# Patient Record
Sex: MALE | Race: OTHER | NOT HISPANIC OR LATINO | Employment: FULL TIME | URBAN - METROPOLITAN AREA
[De-identification: names, ages, dates, MRNs, and addresses within clinical notes are randomized per-mention and may not be internally consistent; named-entity substitution may affect disease eponyms.]

---

## 2022-05-12 ENCOUNTER — OFFICE VISIT (OUTPATIENT)
Dept: URGENT CARE | Age: 27
End: 2022-05-12
Payer: COMMERCIAL

## 2022-05-12 ENCOUNTER — APPOINTMENT (OUTPATIENT)
Dept: RADIOLOGY | Age: 27
End: 2022-05-12
Payer: COMMERCIAL

## 2022-05-12 VITALS
HEIGHT: 75 IN | WEIGHT: 250 LBS | DIASTOLIC BLOOD PRESSURE: 83 MMHG | HEART RATE: 49 BPM | BODY MASS INDEX: 31.08 KG/M2 | SYSTOLIC BLOOD PRESSURE: 129 MMHG | TEMPERATURE: 97.7 F | RESPIRATION RATE: 18 BRPM | OXYGEN SATURATION: 99 %

## 2022-05-12 DIAGNOSIS — R07.1 CHEST PAIN ON BREATHING: Primary | ICD-10-CM

## 2022-05-12 DIAGNOSIS — R07.1 CHEST PAIN ON BREATHING: ICD-10-CM

## 2022-05-12 LAB
ATRIAL RATE: 47 BPM
P AXIS: 27 DEGREES
PR INTERVAL: 166 MS
QRS AXIS: 51 DEGREES
QRSD INTERVAL: 88 MS
QT INTERVAL: 428 MS
QTC INTERVAL: 378 MS
T WAVE AXIS: 24 DEGREES
VENTRICULAR RATE: 47 BPM

## 2022-05-12 PROCEDURE — 93010 ELECTROCARDIOGRAM REPORT: CPT | Performed by: INTERNAL MEDICINE

## 2022-05-12 PROCEDURE — 71046 X-RAY EXAM CHEST 2 VIEWS: CPT

## 2022-05-12 PROCEDURE — G0383 LEV 4 HOSP TYPE B ED VISIT: HCPCS | Performed by: FAMILY MEDICINE

## 2022-05-12 PROCEDURE — 93005 ELECTROCARDIOGRAM TRACING: CPT | Performed by: FAMILY MEDICINE

## 2022-05-12 NOTE — PATIENT INSTRUCTIONS
Chest Pain   AMBULATORY CARE:   Chest pain  can be caused by a range of conditions, from not serious to life-threatening  It is important to follow up with your healthcare provider to find the cause of your chest pain  Common symptoms you may have with chest pain:   Fever or sweating    Nausea or vomiting    Shortness of breath    Discomfort or pressure that spreads from your chest to your back, jaw, or arm    A racing or slow heartbeat    Feeling weak, tired, or faint    Call your local emergency number (911 in the [facility]) or have someone call if:   You have any of the following signs of a heart attack:      Squeezing, pressure, or pain in your chest    You may  also have any of the following:     Discomfort or pain in your back, neck, jaw, stomach, or arm    Shortness of breath    Nausea or vomiting    Lightheadedness or a sudden cold sweat      Seek care immediately if:   You have chest discomfort that gets worse, even with medicine  You cough or vomit blood  Your bowel movements are black or bloody  You cannot stop vomiting, or it hurts to swallow  Call your doctor if:   You have questions or concerns about your condition or care  Treatment for chest pain  may include medicine to treat your symptoms while your healthcare provider finds the cause of your chest pain  Medicines  may be given to treat the cause of your chest pain  Examples include pain medicine, anxiety medicine, or medicines to increase blood flow to your heart  Do not take certain medicines without asking your healthcare provider first   These include NSAIDs, herbal or vitamin supplements, and hormones, such as estrogen or progestin  One or more stents  may need to be placed in your heart if pain was caused by blockage  A stent is a wire mesh tube that helps hold your artery open  Healthy living tips: If the cause of your chest pain is known, your healthcare provider will give you specific guidelines to follow   The following are general healthy guidelines:  Do not smoke  Nicotine and other chemicals in cigarettes and cigars can cause lung and heart damage  Ask your healthcare provider for information if you currently smoke and need help to quit  E-cigarettes or smokeless tobacco still contain nicotine  Talk to your healthcare provider before you use these products  Choose a variety of healthy foods as often as possible  Include fresh, frozen, or canned fruits and vegetables  Also include low-fat dairy products, fish, chicken (without skin), and lean meats  Your healthcare provider or a dietitian can help you create meal plans  You may need to avoid certain foods or drinks if your pain is caused by a digestion problem  Lower your sodium (salt) intake  Limit foods that are high in sodium, such as canned foods, salty snacks, and cold cuts  If you add salt when you cook food, do not add more at the table  Choose low-sodium canned foods as much as possible  Drink plenty of water every day  Water helps your body to control your temperature and blood pressure  Ask your healthcare provider how much water you should drink every day  Ask about activity  Your healthcare provider will tell you which activities to limit or avoid  Ask when you can drive, return to work, and have sex  Ask about the best exercise plan for you  Maintain a healthy weight  Ask your healthcare provider what a healthy weight is for you  Ask him or her to help you create a safe weight loss plan if you are overweight  Ask about vaccines you may need  Your healthcare provider can tell you which vaccines you need, and when to get them  The following vaccines help prevent diseases that can become serious for a person with a heart condition:    The influenza (flu) vaccine is given each year  Get a flu vaccine as soon as recommended, usually in September or October  The pneumonia vaccine is usually given every 5 years    Your healthcare provider may recommend the pneumonia vaccine if you are 72 or older  COVID-19 vaccines are given to adults as a shot in 1 or 2 doses  Vaccination is recommended for all adults  A booster (additional) dose is also recommended to help your immune system continue to protect against severe COVID-19  The booster can be a different brand of the COVID-19 vaccine than you originally received  The timing for the booster depends on the type of vaccine you received:    1-dose vaccine: The booster is given at least 2 months after you received the vaccine  2-dose vaccine: The booster is given at least 5 to 6 months after the second dose  Follow up with your doctor within 72 hours, or as directed: You may need to return for more tests to find the cause of your chest pain  You may be referred to a specialist, such as a cardiologist or gastroenterologist  Write down your questions so you remember to ask them during your visits  © Copyright [employer] 2022 Information is for End User's use only and may not be sold, redistributed or otherwise used for commercial purposes  All illustrations and images included in CareNotes® are the copyrighted property of [employer] [facility] [employer]  or [facility] Florina Alexandre   The above information is an  only  It is not intended as medical advice for individual conditions or treatments  Talk to your doctor, nurse or pharmacist before following any medical regimen to see if it is safe and effective for you

## 2022-05-12 NOTE — LETTER
To whom it may concern,      Kristel Weir was seen in my office on 05/12/22  He may return to work tomorrow  Thank you!       Sincerely,    Varun Gayle, DO

## 2022-05-12 NOTE — PROGRESS NOTES
[email or web address]        NAME: Charlie Humphreys is a 31 y o  male  : 1995    MRN: [id number]  DATE: May 16, 2022  TIME: 4:35 PM    Assessment and Plan   Chest pain on breathing [R07 1]  1  Chest pain on breathing  XR chest pa & lateral         Patient Instructions     EKG reveals sinus bradycardia  This morning the pain was worse with deep inhalation  The pain is not reproducible  Chest x-ray shows no acute abnormalities  I did recommend that he follow-up with Cardiology due to his history of myocarditis  However, at this time of delivery he is stable and able to go home  I did explain signs and symptoms and when she should follow up in the ED  He vocalized understanding  Follow up with PCP in 3-5 days if no improvement  Proceed to  ER if symptoms worsen  Chief Complaint     Chief Complaint   Patient presents with    Chest Pain     Pt reports central CP that started this morning   +pain pulses  Pt last felt this way when he had myocarditis         History of Present Illness       31year old male presents today complaining of chest pain  He states the pain was present this morning when he woke up  The pain has since resolved  He denies any shortness of breath or increased pain with exertion  He does note that his pain increased with deep inhalation this morning  He does have a history of myocarditis and was concerned that this may be related  He has not seen his PCP or cardiologist in many years  He states that he feels fine at this time  He has no current complaints  Review of Systems   Review of Systems   Constitutional: Negative for chills and fever  HENT: Negative for congestion, ear pain, postnasal drip, sinus pain and sore throat  Eyes: Negative for pain and itching  Respiratory: Negative for cough, shortness of breath and wheezing  Cardiovascular: Negative for chest pain and palpitations     Gastrointestinal: Negative for abdominal pain, constipation, diarrhea, nausea and vomiting  Genitourinary: Negative for difficulty urinating and hematuria  Musculoskeletal: Negative for arthralgias and myalgias  Skin: Negative for rash  Neurological: Negative for dizziness, light-headedness and headaches  Psychiatric/Behavioral: Negative for agitation and sleep disturbance  The patient is not nervous/anxious  Current Medications     No current outpatient medications on file  Current Allergies     Allergies as of 05/12/2022 - Reviewed 05/12/2022   Allergen Reaction Noted    Penicillins Hives 05/12/2022            The following portions of the patient's history were reviewed and updated as appropriate: allergies, current medications, past family history, past medical history, past social history, past surgical history and problem list      Past Medical History:   Diagnosis Date    Other acute myocarditis        Past Surgical History:   Procedure Laterality Date    ELBOW SURGERY         History reviewed  No pertinent family history  Medications have been verified  Objective   /83   Pulse (!) 49   Temp 97 7 °F (36 5 °C)   Resp 18   Ht 6' 3" (1 905 m)   Wt 113 kg (250 lb)   SpO2 99%   BMI 31 25 kg/m²   No LMP for male patient  Physical Exam     Physical Exam  Vitals reviewed  Constitutional:       General: He is not in acute distress  Appearance: Normal appearance  He is not ill-appearing  HENT:      Head: Normocephalic and atraumatic  Eyes:      Extraocular Movements: Extraocular movements intact  Conjunctiva/sclera: Conjunctivae normal    Cardiovascular:      Heart sounds: Normal heart sounds  No murmur heard  Pulmonary:      Effort: Pulmonary effort is normal       Breath sounds: Normal breath sounds  No wheezing  Chest:      Chest wall: No mass or tenderness  Skin:     General: Skin is warm  Neurological:      General: No focal deficit present  Mental Status: He is alert     Psychiatric:         Mood and Affect: Mood normal          Behavior: Behavior normal          Judgment: Judgment normal          Chest x-ray is negative for any acute cardiopulmonary process

## 2022-11-17 ENCOUNTER — OFFICE VISIT (OUTPATIENT)
Dept: FAMILY MEDICINE CLINIC | Facility: CLINIC | Age: 27
End: 2022-11-17

## 2022-11-17 VITALS
HEART RATE: 64 BPM | TEMPERATURE: 97.8 F | HEIGHT: 75 IN | DIASTOLIC BLOOD PRESSURE: 78 MMHG | SYSTOLIC BLOOD PRESSURE: 130 MMHG | WEIGHT: 272.8 LBS | RESPIRATION RATE: 16 BRPM | OXYGEN SATURATION: 98 % | BODY MASS INDEX: 33.92 KG/M2

## 2022-11-17 DIAGNOSIS — Z76.89 ENCOUNTER TO ESTABLISH CARE: Primary | ICD-10-CM

## 2022-11-17 DIAGNOSIS — Z86.79 HISTORY OF VIRAL MYOCARDITIS: ICD-10-CM

## 2022-11-17 DIAGNOSIS — Z86.59 HISTORY OF ATTENTION DEFICIT HYPERACTIVITY DISORDER (ADHD): ICD-10-CM

## 2022-11-17 DIAGNOSIS — Z72.0 TOBACCO USE: ICD-10-CM

## 2022-11-17 NOTE — PROGRESS NOTES
Name: Jaqueline Kenny      : 1995      MRN: [id number]  Encounter Provider: Amador Capellan DO  Encounter Date: 2022   Encounter department: [address] [place] [address]   Patient declines flu vaccine today  Patient is being referred to [facility] for further evaluation and treatment of ADHD  Discussed lifestyle changes  Patient instructed to follow a low-fat and low-carbohydrate diet and get regular aerobic exercise 150 minutes per week  Recommend patient discontinue smoking  Return the office p r n  1  Encounter to establish care    2  History of attention deficit hyperactivity disorder (ADHD)  -     Ambulatory Referral to Psychiatry; Future    3  History of viral myocarditis    4  Tobacco use           Subjective      Patient is a 23-year-old male is a new patient to our practice being seen to establish care  Patient moved to [place] from [place] at the being of this year and currently working as a   He has not had a recent PCP  Patient graduated from [employer] with degree in sports management  Past medical history significant for ADHD diagnosed at 24years old and patient states he took Vyvanse through college  Patient has history of viral myocarditis in 2013 was admitted to [facility] 5 days and had a cardiac catheterization  Patient also had mono while in college  Patient recently recovered from wrist fracture  Patient admits to smoking half pack cigarettes per day for the past 10 years and drinks alcohol only occasionally  He admits to using marijuana occasionally  No regular exercise recently although patient remains fairly active at work  Patient is requesting restarting medication for ADHD  Review of Systems   Constitutional: Negative for activity change, appetite change, chills, diaphoresis, fatigue, fever and unexpected weight change  HENT: Negative  Eyes: Negative      Respiratory: Positive for shortness of breath and wheezing  Negative for cough and chest tightness  Cardiovascular: Negative for chest pain, palpitations and leg swelling  Gastrointestinal: Negative for abdominal pain, blood in stool, constipation, diarrhea, nausea and vomiting  Endocrine: Negative for cold intolerance and heat intolerance  Genitourinary: Negative for difficulty urinating, dysuria, frequency and hematuria  Musculoskeletal: Negative for arthralgias, back pain, gait problem, joint swelling, myalgias, neck pain and neck stiffness  Skin: Negative  Neurological: Negative for dizziness, syncope, weakness, light-headedness and headaches  Hematological: Negative for adenopathy  Does not bruise/bleed easily  Psychiatric/Behavioral: Positive for decreased concentration and sleep disturbance  Negative for confusion and dysphoric mood  The patient is nervous/anxious  No current outpatient medications on file prior to visit  Objective     /78   Pulse 64   Temp 97 8 °F (36 6 °C) (Tympanic)   Resp 16   Ht 6' 3" (1 905 m)   Wt 124 kg (272 lb 12 8 oz)   SpO2 98%   BMI 34 10 kg/m²     Physical Exam  Constitutional:       General: He is not in acute distress  Appearance: Normal appearance  HENT:      Head: Normocephalic  Mouth/Throat:      Mouth: Mucous membranes are moist    Eyes:      General: No scleral icterus  Conjunctiva/sclera: Conjunctivae normal    Cardiovascular:      Rate and Rhythm: Normal rate and regular rhythm  Pulmonary:      Effort: Pulmonary effort is normal       Breath sounds: Normal breath sounds  Abdominal:      Palpations: Abdomen is soft  Tenderness: There is no abdominal tenderness  Musculoskeletal:      Cervical back: Neck supple  Right lower leg: No edema  Left lower leg: No edema  Lymphadenopathy:      Cervical: No cervical adenopathy  Skin:     General: Skin is warm and dry     Neurological:      General: No focal deficit present  Mental Status: He is alert and oriented to person, place, and time  Psychiatric:         Mood and Affect: Mood normal          Behavior: Behavior normal          Thought Content: Thought content normal          Judgment: Judgment normal        Leilani Mejia DO  BMI Counseling: Body mass index is 34 1 kg/m²  The BMI is above normal  Nutrition recommendations include reducing portion sizes, decreasing overall calorie intake, 3-5 servings of fruits/vegetables daily, reducing fast food intake, consuming healthier snacks, decreasing soda and/or juice intake, moderation in carbohydrate intake, increasing intake of lean protein, reducing intake of saturated fat and trans fat and reducing intake of cholesterol  Exercise recommendations include moderate aerobic physical activity for 150 minutes/week

## 2022-11-18 ENCOUNTER — TELEPHONE (OUTPATIENT)
Dept: PSYCHIATRY | Facility: CLINIC | Age: 27
End: 2022-11-18

## 2022-11-18 NOTE — TELEPHONE ENCOUNTER
Called Aimee Hadley  regarding routine  Lvm advising to return call to Intake Dept at [phone] to be placed on appropriate wait list

## 2022-11-28 ENCOUNTER — TELEPHONE (OUTPATIENT)
Dept: PSYCHIATRY | Facility: CLINIC | Age: 27
End: 2022-11-28

## 2022-12-01 NOTE — TELEPHONE ENCOUNTER
Pt returned call and has been placed on med [facility] wait list

## 2023-03-17 ENCOUNTER — TELEPHONE (OUTPATIENT)
Dept: PSYCHIATRY | Facility: CLINIC | Age: 28
End: 2023-03-17

## 2023-03-17 NOTE — TELEPHONE ENCOUNTER
Sent Wait List Letter VIA [employer]   Verify patients need of services from wait list after 15 days   If no communication remove from Medication Management  wait list